# Patient Record
Sex: MALE | URBAN - METROPOLITAN AREA
[De-identification: names, ages, dates, MRNs, and addresses within clinical notes are randomized per-mention and may not be internally consistent; named-entity substitution may affect disease eponyms.]

---

## 2019-11-08 ENCOUNTER — HOSPITAL ENCOUNTER (OUTPATIENT)
Dept: TELEMEDICINE | Facility: HOSPITAL | Age: 79
Discharge: HOME | End: 2019-11-08

## 2019-11-08 DIAGNOSIS — I63.432 STROKE DUE TO EMBOLISM OF LEFT POSTERIOR CEREBRAL ARTERY: ICD-10-CM

## 2019-11-08 PROCEDURE — G0427 INPT/ED TELECONSULT70: HCPCS | Mod: GT,G0,, | Performed by: PSYCHIATRY & NEUROLOGY

## 2019-11-08 PROCEDURE — G0427 PR INPT TELEHEALTH CON 70/>M: ICD-10-PCS | Mod: GT,G0,, | Performed by: PSYCHIATRY & NEUROLOGY

## 2019-11-08 NOTE — CONSULTS
[facility]  Vascular Neurology  Comprehensive Stroke Center  Tele-Consultation Note      Consults    Consulting Provider: LIANNA RODRIGUEZ  Current Providers  No providers found    Patient Location: [facility]  Spoke hospital nurse at bedside with patient assisting consultant.     Patient information was obtained from patient.         Assessment/Plan:     STROKE DOCUMENTATION     Acute Stroke Times:   Acute Stroke Times   Last Known Normal Date: 11/08/19  Last Known Normal Time: 1140  Symptom Onset Date: 11/08/19  Symptom Onset Time: 1140  Stroke Team Called Date: 11/08/19  Stroke Team Called Time: 1224  Stroke Team Arrival Date: 11/08/19  Stroke Team Arrival Time: 1233  CT Interpretation Time: 1233  Decision to Treat Time for Alteplase: 1245    NIH Scale:  Interval: baseline  1a. Level of Consciousness: 0-->Alert, keenly responsive  1b. LOC Questions: 0-->Answers both questions correctly  1c. LOC Commands: 0-->Performs both tasks correctly  2. Best Gaze: 0-->Normal  3. Visual: 2-->Complete hemianopia  4. Facial Palsy: 1-->Minor paralysis (flattened nasolabial fold, asymmetry on smiling)  5a. Motor Arm, Left: 0-->No drift, limb holds 90 (or 45) degrees for full 10 secs  5b. Motor Arm, Right: 0-->No drift, limb holds 90 (or 45) degrees for full 10 secs  6a. Motor Leg, Left: 0-->No drift, leg holds 30 degree position for full 5 secs  6b. Motor Leg, Right: 0-->No drift, leg holds 30 degree position for full 5 secs  7. Limb Ataxia: 0-->Absent  8. Sensory: 0-->Normal, no sensory loss  9. Best Language: 0-->No aphasia, normal  10. Dysarthria: 0-->Normal  11. Extinction and Inattention (formerly Neglect): 0-->No abnormality  Total (NIH Stroke Scale): 3     Modified Po Score: 0  [place] Coma Scale:    ABCD2 Score:    [id number] Score:   HAS -BLED Score:   ICH Score:   Hunt & Berg Classification:       Diagnoses:    Stroke due to embolism of left posterior cerebral artery  Stroke due to embolism of left posterior cerebral artery  Antithrombotics for secondary stroke prevention: Antiplatelets: None: Hold all Antithrombotics x 24 hours after IV t-PA administration    Statins for secondary stroke prevention and hyperlipidemia, if present:   Statins: Atorvastatin- 80 mg daily    Aggressive risk factor modification: HTN     Rehab efforts: The patient has been evaluated by a stroke team provider and the therapy needs have been fully considered based off the presenting complaints and exam findings. The following therapy evaluations are needed: PT evaluate and treat, OT evaluate and treat, SLP evaluate and treat    Diagnostics ordered/pending: CTA Head to assess vasculature , CTA Neck/Arch to assess vasculature, Lipid Profile to assess cholesterol levels, MRI head without contrast to assess brain parenchyma, TTE to assess cardiac function/status     VTE prophylaxis: None: Reason for No Pharmacological VTE Prophylaxis: Mechanical prophylaxis: Place SCDs    BP parameters: Infarct: Post tPA, SBP <180            There were no vitals taken for this visit.  Alteplase Eligible?: Yes  Alteplase Recommendation:   Alteplase Total Dose:   Total dose: Alteplase 0.9mg/kg (max dose:90mg)                      ** based on acquired weight from facility   Bolus Dose:   10% of total Alteplase dose given intravenously over 1 minute   Continuous Infusion Dose:   Remaining 90% of total Alteplase dose infused intravenously over 60 minutes    **infusion must start at the same time as the bolus dose     Additional Recommendations:   1. Neurological assessment and vital signs (except temperature) every 15 minutes during Altaplase infusion.  2. Frequency of BP assessments may need to be increased if systolic BP stays >= 180 mm Hg or diastolic BP stays >= 105 mm Hg. Administer antihypertensive meds as ordered  3. Continue to monitor and control blood pressure  and monitor for neurological deterioration every 15 minutes for the first hour after the infusion is stopped. Then every 30 minutes for the next 6 hours. Perform hourly monitoring from the 8th post-infusion hour until 24 hours post-infusion.  4. Temperature every 4 hours or as required.  5. Follow hospital protocol for further orders re: post tPA infusion patient management.  6. No antithrombotics or anticoagulants (including but not limited to: heparin, warfarin, aspirin, clopidigrel, or dipyridamole) for 24 hours, then start antithrombotics as ordered by treating physician    Adapted from the American Heart Association/American Stroke Association (AHA/ASA) and American Association of Neuroscience Nurses (AANN) Guidelines.   Possible Interventional Revascularization Candidate? Yes    Disposition Recommendation: pending further studies  admit to inpatient  do not transfer    Subjective:     History of Present Illness:  80 y/o male LKN 1140 today when while at lunch noted he could only see the left half of the table and people sitting around him. This was accompanied by a sever right supraorbital HA  and dizziness. Denies weakness, numbness or ataxia .                                                                                                      Woke up with symptoms?: no    Recent bleeding noted: no  Does the patient take any Blood Thinners? no  Medications: No relevant medications      Past Medical History: hypertension    Past Surgical History: no relevant surgical history    Family History: no relevant history    Social History: no smoking, no drinking, no drugs    Allergies:  No known drug allergies    Review of Systems   Eyes: Positive for visual disturbance.   Neurological: Positive for facial asymmetry.   All other systems reviewed and are negative.    Objective:   Vitals:  BP: 164/84 and Heart Rate: 78    CT READ: Yes  No hemmorhage. No mass effect. No early infarct signs.     Physical Exam    Constitutional: He is oriented to person, place, and time. He appears well-developed and well-nourished.   HENT:   Head: Normocephalic and atraumatic.   Eyes: Pupils are equal, round, and reactive to light. EOM are normal.   Cardiovascular: Normal rate and regular rhythm.   Pulmonary/Chest: Effort normal.   Neurological: He is alert and oriented to person, place, and time. A cranial nerve deficit is present.   Vitals reviewed.            Recommended the emergency room physician to have a brief discussion with the patient and/or family if available regarding the risks and benefits of treatment, and to briefly document the occurrence of that discussion in his clinical encounter note.     The attending portion of this evaluation, treatment, and documentation was performed per Dixie Godfrey MD via audiovisual.    Billing code:  (moderate to severe stroke, large areas of edema, some mimics)    · This patient has a critical neurological condition/illness, with high morbidity and mortality.  · There is a high probability for acute neurological change leading to clinical and possibly life-threatening deterioration requiring highest level of physician preparedness for urgent intervention.  · Care was coordinated with other physicians involved in the patient's care.  · Radiologic studies and laboratory data were reviewed and interpreted, and plan of care was re-assessed based on the results.  · Diagnosis, treatment options and prognosis may have been discussed with the patient and/or family members or caregiver.  · Further advanced medical management and further evaluation is warranted for his care.      In your opinion, this was a: Tier 1 Van Positive    Consult End Time: 9213     Dixie Godfrey MD  [facility]  Vascular Neurology   [facility]

## 2019-11-08 NOTE — SUBJECTIVE & OBJECTIVE
Woke up with symptoms?: no    Recent bleeding noted: no  Does the patient take any Blood Thinners? no  Medications: No relevant medications      Past Medical History: hypertension    Past Surgical History: no relevant surgical history    Family History: no relevant history    Social History: no smoking, no drinking, no drugs    Allergies:  No known drug allergies    Review of Systems   Eyes: Positive for visual disturbance.   Neurological: Positive for facial asymmetry.   All other systems reviewed and are negative.    Objective:   Vitals:  BP: 164/84 and Heart Rate: 78    CT READ: Yes  No hemmorhage. No mass effect. No early infarct signs.     Physical Exam   Constitutional: He is oriented to person, place, and time. He appears well-developed and well-nourished.   HENT:   Head: Normocephalic and atraumatic.   Eyes: Pupils are equal, round, and reactive to light. EOM are normal.   Cardiovascular: Normal rate and regular rhythm.   Pulmonary/Chest: Effort normal.   Neurological: He is alert and oriented to person, place, and time. A cranial nerve deficit is present.   Vitals reviewed.

## 2019-11-08 NOTE — ASSESSMENT & PLAN NOTE
Stroke due to embolism of left posterior cerebral artery  Antithrombotics for secondary stroke prevention: Antiplatelets: None: Hold all Antithrombotics x 24 hours after IV t-PA administration    Statins for secondary stroke prevention and hyperlipidemia, if present:   Statins: Atorvastatin- 80 mg daily    Aggressive risk factor modification: HTN     Rehab efforts: The patient has been evaluated by a stroke team provider and the therapy needs have been fully considered based off the presenting complaints and exam findings. The following therapy evaluations are needed: PT evaluate and treat, OT evaluate and treat, SLP evaluate and treat    Diagnostics ordered/pending: CTA Head to assess vasculature , CTA Neck/Arch to assess vasculature, Lipid Profile to assess cholesterol levels, MRI head without contrast to assess brain parenchyma, TTE to assess cardiac function/status     VTE prophylaxis: None: Reason for No Pharmacological VTE Prophylaxis: Mechanical prophylaxis: Place SCDs    BP parameters: Infarct: Post tPA, SBP <180

## 2019-11-08 NOTE — HPI
78 y/o male LKN 1140 today when while at lunch noted he could only see the left half of the table and people sitting around him. This was accompanied by a sever right supraorbital HA  and dizziness. Denies weakness, numbness or ataxia .